# Patient Record
Sex: FEMALE | Race: BLACK OR AFRICAN AMERICAN | Employment: FULL TIME | URBAN - METROPOLITAN AREA
[De-identification: names, ages, dates, MRNs, and addresses within clinical notes are randomized per-mention and may not be internally consistent; named-entity substitution may affect disease eponyms.]

---

## 2024-02-19 ENCOUNTER — OFFICE VISIT (OUTPATIENT)
Dept: OBGYN CLINIC | Facility: CLINIC | Age: 24
End: 2024-02-19
Payer: COMMERCIAL

## 2024-02-19 VITALS — BODY MASS INDEX: 22.42 KG/M2 | HEIGHT: 67 IN | WEIGHT: 142.81 LBS

## 2024-02-19 DIAGNOSIS — Z12.4 CERVICAL CANCER SCREENING: ICD-10-CM

## 2024-02-19 DIAGNOSIS — N76.0 VAGINITIS AND VULVOVAGINITIS: Primary | ICD-10-CM

## 2024-02-19 PROCEDURE — 81514 NFCT DS BV&VAGINITIS DNA ALG: CPT | Performed by: OBSTETRICS & GYNECOLOGY

## 2024-02-19 RX ORDER — METRONIDAZOLE 500 MG/1
500 TABLET ORAL 2 TIMES DAILY
Qty: 14 TABLET | Refills: 0 | Status: SHIPPED | OUTPATIENT
Start: 2024-02-19 | End: 2024-02-26

## 2024-02-19 NOTE — H&P
[facility]  Obstetrics and Gynecology  History & Physical    CC:   Chief Complaint   Patient presents with    New Patient    Discharge     With odor  f        Subjective:     HPI: Magalis Young is a 23 year old  female here for the above chief complaint and follow up pap smear. Patient reports last year had a normal pap with BV and yeast at that time. Noted started to feel an abnormal odor about 1 mo ago. Noted discharge is cream white color appearing. Aggravating factors unknown. Not douching. Has clifford taking probiotics and this does help when using Euro probiotics. Then most recently the smell became worse. She tried boric acid suppositories which helped with the smell.       Has used OCP in the past but stopped to have more natural cycles given some headaches with taking OCP. Considering restarting OCP.     Declined STI testing given recent negative.     OB History:  OB History    Para Term  AB Living   1 0 0 0 1 0   SAB IAB Ectopic Multiple Live Births   0 0 0 0 0       Gyne History:  Hx Prior Abnormal Pap: No  Pap Date: 23  Pap Result Notes: [facility]  Patient's last menstrual period was 2024.  No history of STDs.    Last pap NILM per patient in .   Sexual history: Active? Yes, 1 male partner(s)  Birth control? None/ pull out method.     Meds:  No current outpatient medications on file prior to visit.     No current facility-administered medications on file prior to visit.       All:  No Known Allergies    PMH:  Past Medical History:   Diagnosis Date    Patient denies medical problems        Immunization History:     There is no immunization history on file for this patient.    PSH:  Past Surgical History:   Procedure Laterality Date    PATIENT DENIES ANY SURGICAL HISTORY         Social History:  Social History     Socioeconomic History    Marital status: Single     Spouse name: Not on file    Number of children: Not on file    Years of education: Not on file     Highest education level: Not on file   Occupational History    Not on file   Tobacco Use    Smoking status: Never    Smokeless tobacco: Never   Vaping Use    Vaping Use: Not on file   Substance and Sexual Activity    Alcohol use: Not Currently    Drug use: Never    Sexual activity: Yes     Partners: Male   Other Topics Concern     Service Not Asked    Blood Transfusions No    Caffeine Concern Not Asked    Occupational Exposure Not Asked    Hobby Hazards Not Asked    Sleep Concern Not Asked    Stress Concern Not Asked    Weight Concern Not Asked    Special Diet Not Asked    Back Care Not Asked    Exercise Not Asked    Bike Helmet Not Asked    Seat Belt Not Asked    Self-Exams Not Asked   Social History Narrative    No abuse     Social Determinants of Health     Financial Resource Strain: Not on file   Food Insecurity: Not on file   Transportation Needs: Not on file   Physical Activity: Not on file   Stress: Not on file   Social Connections: Not on file   Housing Stability: Not on file         Patient feels unsafe or threatened?: No  Family History:  History reviewed. No pertinent family history.    Health maintenance:  Mammogram: due at 38   Colonoscopy: due at 43.     Review of Systems:  General: no complaints  Eyes: no complaints  Respiratory: no complaints  Cardiovascular: no complaints  GI: no complaints  : See HPI  Hematological/lymphatic: no complaints  Breast: no complaints  Psychiatric: no complaints  Endocrine:no complaints  Neurological: no complaints  Immunological: no complaints  Musculoskeletal:no complaints      Objective:     Vitals:    02/19/24 1408   Weight: 142 lb 12.8 oz (64.8 kg)   Height: 67\"         Body mass index is 22.37 kg/m².    Exam with ERNESTINE Vidal present:   GENERAL: well developed, well nourished, in no apparent distress, alert and orientated X 3  PSYCH: mood and affect stable   SKIN: no rashes, no lesions  LUNGS: respiration unlabored  CARDIOVASCULAR: no  peripheral edema or varicosities, skin warm and dry  ABDOMEN: Soft, non distended; non tender, no masses  GYNE/:   External Genitalia: normal, no lesions, good perineal support  Urethra: meatus normal   Bladder: well supported  Vagina: normal mildly inflamed mucosa, no lesions, normal appearing discharge   Cervix: normal os, no lesions or bleeding  Cul-de-sac: normal  R/V: normal perineum, no hemorrhoids  EXTREMITIES:  Normal range of motion, strength 5/5 walking, nontender without edema    Assessment:     Magalis Young is a 23 year old  female here for vaginitis and cervical cancer screening.     There is no problem list on file for this patient.        Plan:   1. Vaginitis and vulvovaginitis  - Reviewed history of BV and clinical presentation consistent with BV.   - Recommend empiric Prescription. Patient agreed. Recommend to finish if feeling better. Prescription for diflucan if yeast positive.   - Vaginitis Vaginosis PCR Panel; Future  - metRONIDAZOLE 500 MG Oral Tab; Take 1 tablet (500 mg total) by mouth in the morning and 1 tablet (500 mg total) before bedtime. Do all this for 7 days.  Dispense: 14 tablet; Refill: 0    2. Cervical cancer screening  - ThinPrep PAP with HPV Reflex Request; Future     All of the findings and plan were discussed with the patient.  She notes understanding and agrees with the plan of care.  All questions were answered to the best of my ability at this time.      RTC in 1 year for a well woman exam or sooner if needed     Dr. Rubio Hopper MD    [facility]     This note was created by [employer] voice recognition. Errors in content may be related to improper recognition by the system; efforts to review and correct have been done but errors may still exist. Please be advised the primary purpose of this note is for me to communicate medical care. Standard sentence structure is not always used. Medical terminology and medical abbreviations may be used. There may be grammatical,  typographical, and automated fill ins that may have errors missed in proofreading.

## 2024-02-20 LAB
BV BACTERIA DNA VAG QL NAA+PROBE: NEGATIVE
C GLABRATA DNA VAG QL NAA+PROBE: NEGATIVE
C KRUSEI DNA VAG QL NAA+PROBE: NEGATIVE
CANDIDA DNA VAG QL NAA+PROBE: POSITIVE
T VAGINALIS DNA VAG QL NAA+PROBE: NEGATIVE

## 2024-02-22 RX ORDER — FLUCONAZOLE 150 MG/1
TABLET ORAL
Qty: 2 TABLET | Refills: 0 | Status: SHIPPED | OUTPATIENT
Start: 2024-02-22

## 2024-06-19 RX ORDER — FLUCONAZOLE 150 MG/1
TABLET ORAL
Qty: 2 TABLET | Refills: 0 | Status: SHIPPED | OUTPATIENT
Start: 2024-06-19

## 2024-07-09 ENCOUNTER — OFFICE VISIT (OUTPATIENT)
Dept: OBGYN CLINIC | Facility: CLINIC | Age: 24
End: 2024-07-09
Payer: COMMERCIAL

## 2024-07-09 VITALS
WEIGHT: 141.19 LBS | SYSTOLIC BLOOD PRESSURE: 104 MMHG | DIASTOLIC BLOOD PRESSURE: 62 MMHG | HEIGHT: 67 IN | BODY MASS INDEX: 22.16 KG/M2

## 2024-07-09 DIAGNOSIS — N94.9 GENITAL LESION, FEMALE: Primary | ICD-10-CM

## 2024-07-09 PROCEDURE — 11420 EXC H-F-NK-SP B9+MARG 0.5/<: CPT | Performed by: OBSTETRICS & GYNECOLOGY

## 2024-07-09 PROCEDURE — 88305 TISSUE EXAM BY PATHOLOGIST: CPT | Performed by: OBSTETRICS & GYNECOLOGY

## 2024-07-09 PROCEDURE — 3078F DIAST BP <80 MM HG: CPT | Performed by: OBSTETRICS & GYNECOLOGY

## 2024-07-09 PROCEDURE — 99214 OFFICE O/P EST MOD 30 MIN: CPT | Performed by: OBSTETRICS & GYNECOLOGY

## 2024-07-09 PROCEDURE — 3008F BODY MASS INDEX DOCD: CPT | Performed by: OBSTETRICS & GYNECOLOGY

## 2024-07-09 PROCEDURE — 3074F SYST BP LT 130 MM HG: CPT | Performed by: OBSTETRICS & GYNECOLOGY

## 2024-07-09 RX ORDER — IMIQUIMOD 12.5 MG/.25G
1 CREAM TOPICAL NIGHTLY
Qty: 12 EACH | Refills: 0 | Status: SHIPPED | OUTPATIENT
Start: 2024-07-09

## 2024-07-09 NOTE — PROGRESS NOTES
[facility]  Obstetrics and Gynecology   Follow Up    Subjective:     Magalis Young is a 23 year old  female who presents with c/o   Chief Complaint   Patient presents with    Acrochordon     Pts states she here for vaginal growth/skin tags       The patient reports lumps on her vagina. Noted this has occurred before. They went away about 1 year ago. They start small. No pain. No drainage. No spreading, just increasing in size. Partner does not have.     Review of Systems:  General: no complaints  Eyes: no complaints  Respiratory: no complaints  Cardiovascular: no complaints  GI: no complaints  : See HPI  Hematological/lymphatic: no complaints  Breast: no complaints  Psychiatric: no complaints  Endocrine:no complaints  Neurological: no complaints  Immunological: no complaints  Musculoskeletal:no complaints    HISTORY:  Past Medical History:    Patient denies medical problems    Skin tags, anus or rectum      Past Surgical History:   Procedure Laterality Date    Patient denies any surgical history        History reviewed. No pertinent family history.   Social History     Socioeconomic History    Marital status: Single   Tobacco Use    Smoking status: Never    Smokeless tobacco: Never   Substance and Sexual Activity    Alcohol use: Not Currently    Drug use: Never    Sexual activity: Yes     Partners: Male     Birth control/protection: Condom   Other Topics Concern    Blood Transfusions No   Social History Narrative    No abuse           Objective:     Vitals:    24 1552   BP: 104/62   Weight: 141 lb 3.2 oz (64 kg)   Height: 67\"         Body mass index is 22.12 kg/m².    Exam with MA matti Tinneal present:   GENERAL: well developed, well nourished, in no apparent distress, alert and orientated X 3  PSYCH: mood and affect stable   SKIN: no rashes, no lesions  LUNGS: respiration unlabored  CARDIOVASCULAR: no peripheral edema or varicosities, skin warm and dry  GYNE/:    External Genitalia: normal, no lesions, good perineal support  Urethra: meatus normal   Bladder: well supported  Vagina: normal mucosa, no lesions, normal discharge at the introitus.   R/V: normal perineum except for four 1 mm warty like projections from the posterior fourchette/perineum, no hemorrhoids  EXTREMITIES:  Normal range of motion, strength 5/5 walking.        Assessment:     Magalis Young is a 23 year old  female who presents for genital lesions    There is no problem list on file for this patient.        Plan:     1. Genital lesion, female  - Recommend removal today. See procedure note.   - Counseled most likely genital warts and to avoid sexual contact when having outbreak. Reviewed most outbreaks are limited and infrequent. Follow up if having more lesions. Aldera topical at home prior to. Warned that   - Imiquimod 5 % External Cream; Apply 1 Application topically nightly.  Dispense: 12 each; Refill: 0  - Specimen to Pathology, Tissue; Future  - Specimen to Pathology, Tissue     All of the findings and plan were discussed with the patient.  She notes understanding and agrees with the plan of care.  All questions were answered to the best of my ability at this time.    RTC fro annual when due or sooner if needed    Dr. Rubio Hopper MD    [facility]     This note was created by [employer] voice recognition. Errors in content may be related to improper recognition by the system; efforts to review and correct have been done but errors may still exist. Please be advised the primary purpose of this note is for me to communicate medical care. Standard sentence structure is not always used. Medical terminology and medical abbreviations may be used. There may be grammatical, typographical, and automated fill ins that may have errors missed in proofreading.

## 2024-07-09 NOTE — PROCEDURES
Vulvar lesions excision procedure note    Consent signed.  Procedure discussed with patient in detail including indication, risk, benefits, alternatives and complications.    Procedure:  The patient was prepped and draped in the dorsal lithotomy position.  Betadine splash of perineal lesions.   Injected 1%lidocaine with epinephrine.   Picked up and cut off with scissors four 1 mm warty lesions from the perineum.   All sent to pathology.   Silver nitrate to excision areas.   Patient tolerated procedure well.      Findings:  Condyloma about 1 mm in size with 4 lesions noted and removed.     Impression:  Benign genital warts.     Dr. Rubio Hopper MD    [facility]     This note was created by [employer] voice recognition. Errors in content may be related to improper recognition by the system; efforts to review and correct have been done but errors may still exist. Please be advised the primary purpose of this note is for me to communicate medical care. Standard sentence structure is not always used. Medical terminology and medical abbreviations may be used. There may be grammatical, typographical, and automated fill ins that may have errors missed in proofreading.

## 2024-09-14 ENCOUNTER — E-VISIT (OUTPATIENT)
Dept: TELEHEALTH | Age: 24
End: 2024-09-14
Payer: COMMERCIAL

## 2024-09-14 DIAGNOSIS — L30.1 DYSHIDROTIC ECZEMA: Primary | ICD-10-CM

## 2024-09-14 RX ORDER — TRIAMCINOLONE ACETONIDE 1 MG/G
CREAM TOPICAL 2 TIMES DAILY PRN
Qty: 45 G | Refills: 0 | Status: SHIPPED | OUTPATIENT
Start: 2024-09-14

## 2024-09-14 NOTE — PROGRESS NOTES
Magalis Young is a 24 year old female submitting e-visit for rash/prescription refill.  HPI:   See answers to questionnaire and [employer] message exchange  Requesting triamcinolone cream    Current Outpatient Medications   Medication Sig Dispense Refill    Imiquimod 5 % External Cream Apply 1 Application topically nightly. 12 each 0    fluconazole (DIFLUCAN) 150 MG Oral Tab Take 1 tablet by mouth now. May repeat in 72 hrs if symptoms persist. Please call office if not resolved after 2 doses. 2 tablet 0      Past Medical History:    Patient denies medical problems    Skin tags, anus or rectum      Past Surgical History:   Procedure Laterality Date    Patient denies any surgical history        No family history on file.   Social History:  Social History     Socioeconomic History    Marital status: Single   Tobacco Use    Smoking status: Never    Smokeless tobacco: Never   Substance and Sexual Activity    Alcohol use: Not Currently    Drug use: Never    Sexual activity: Yes     Partners: Male     Birth control/protection: Condom   Other Topics Concern    Blood Transfusions No   Social History Narrative    No abuse           ASSESSMENT AND PLAN:       Diagnoses and all orders for this visit:    Dyshidrotic eczema  -     triamcinolone 0.1 % External Cream; Apply topically 2 (two) times daily as needed.      Will treat with medication as listed.  Info provided on use, dose, and possible side effects  Supportive measures  Proper skin care; avoidance of triggers  Patient advised to follow up with PCP if no improvement or worsening of symptoms  Refer to [employer] message exchange for specific patient instructions      Duration of the E-visit service:  9 minutes

## 2024-12-03 DIAGNOSIS — N94.9 GENITAL LESION, FEMALE: ICD-10-CM

## 2024-12-04 RX ORDER — IMIQUIMOD 12.5 MG/.25G
1 CREAM TOPICAL NIGHTLY
Qty: 12 EACH | Refills: 0 | Status: SHIPPED | OUTPATIENT
Start: 2024-12-04

## 2024-12-08 ENCOUNTER — TELEMEDICINE (OUTPATIENT)
Dept: TELEHEALTH | Age: 24
End: 2024-12-08
Payer: COMMERCIAL

## 2024-12-08 DIAGNOSIS — R39.89 SUSPECTED UTI: Primary | ICD-10-CM

## 2024-12-08 PROBLEM — L30.1 DYSHIDROTIC ECZEMA: Status: ACTIVE | Noted: 2024-12-08

## 2024-12-08 RX ORDER — FLUCONAZOLE 150 MG/1
150 TABLET ORAL ONCE
Qty: 2 TABLET | Refills: 0 | Status: SHIPPED | OUTPATIENT
Start: 2024-12-08 | End: 2024-12-08

## 2024-12-08 RX ORDER — CEFADROXIL 500 MG/1
500 CAPSULE ORAL 2 TIMES DAILY
Qty: 10 CAPSULE | Refills: 0 | Status: SHIPPED | OUTPATIENT
Start: 2024-12-08 | End: 2024-12-13

## 2024-12-08 NOTE — PROGRESS NOTES
Virtual/Telephone Check-In    Magalis Young verbally consents to a Virtual/Telephone Check-In service on 12/08/24.  Patient has been referred to the [facility] website at [email or web address] to review the yearly Consent to Treat document.  Patient understands and accepts financial responsibility for any deductible, co-insurance and/or co-pays associated with this service.       Telehealth Verbal Consent   I conducted a telehealth visit with Magalis Young today, 12/08/24, which was completed using two-way, real-time interactive audio and video communication. This has been done in good kush to provide continuity of care in the best interest of the provider-patient relationship, due to the COVID - public health crisis/national emergency where restrictions of face-to-face office visits are ongoing. Every conscious effort was taken to allow for sufficient and adequate time to complete the visit.  The patient was made aware of the limitations of the telehealth visit, including treatment limitations as no physical exam could be performed.  The patient was advised to call 911 or to go to the ER in case there was an emergency.  The patient was also advised of the potential privacy & security concerns related to the telehealth platform.   The patient was made aware of where to find [facility]'s notice of privacy practices, telehealth consent form and other related consent forms and documents.  which are located on the [facility] website. The patient verbally agreed to telehealth consent form, related consents and the risks discussed.    Lastly, the patient confirmed that they were in [place].   Included in this visit, time may have been spent reviewing labs, medications, radiology tests and decision making. Appropriate medical decision-making and tests are ordered as detailed in the plan of care above.  Coding/billing information is submitted for this visit based on complexity of care and/or time spent for the visit.    CHIEF COMPLAINT:      Chief Complaint   Patient presents with    Urinary Symptoms       HPI:   Magalis Young is a 24 year old female who presents for a video visit.  Patient reports concerns for UTI.  Started with sx yesterday.  Last time had UTI she waited a long time and got really sick.  Up all night with urgency/frequency with scant urination.  Took a bath which did help somewhat.  Now having increased urgency/frequency again.  No burning or pain with urination. No change to smell/appearance.  No vaginal discharge/itching/pain.  LMP  .11/28-12/1.  No concerns for pregnancy.  Thinks last UTI was ~1-1.5 years ago.  No abx use recently    Current Outpatient Medications   Medication Sig Dispense Refill    Imiquimod 5 % External Cream Apply 1 Application topically nightly. 12 each 0    triamcinolone 0.1 % External Cream Apply topically 2 (two) times daily as needed. 45 g 0      Past Medical History:    Patient denies medical problems    Skin tags, anus or rectum      Past Surgical History:   Procedure Laterality Date    Patient denies any surgical history           Social History     Socioeconomic History    Marital status: Single   Tobacco Use    Smoking status: Never    Smokeless tobacco: Never   Substance and Sexual Activity    Alcohol use: Not Currently    Drug use: Never    Sexual activity: Yes     Partners: Male     Birth control/protection: Condom   Other Topics Concern    Blood Transfusions No   Social History Narrative    No abuse         REVIEW OF SYSTEMS:   GENERAL: no fevers. Feels well othrewise  SKIN: no rashes or abnormal skin lesions  GI: denies N/V/C or abdominal pain      EXAM:   General: Alert, Well-appearing, and In no acute distress  Respiratory:   Speaking in full sentences comfortably  Normal work of breathing  Head: Normocephalic  Nose: No obvious nasal discharge.  Skin: No obvious rashes or lesions from what observed.     No results found for this or any previous visit (from the past 24 hours).    ASSESSMENT AND  PLAN:   Magalis Young is a 24 year old female who presents with symptoms that are consistent with    ASSESSMENT:   Encounter Diagnosis   Name Primary?    Suspected UTI Yes       PLAN:  Discussed pros vs cons of obtaining UA/culture.  Due to time of day and hx of UTI, will empirically treat for suspected UTI as courtesy this time. Patient aware in future urine studies will likely be requested prior to starting therapy.      Patient prone to yeast infections with abx use, Diflucan prn.      Close follow up if sx not improving in 48 hours.       Meds & Refills for this Visit:  Requested Prescriptions     Signed Prescriptions Disp Refills    cefadroxil 500 MG Oral Cap 10 capsule 0     Sig: Take 1 capsule (500 mg total) by mouth 2 (two) times daily for 5 days.    fluconazole (DIFLUCAN) 150 MG Oral Tab 2 tablet 0     Sig: Take 1 tablet (150 mg total) by mouth once for 1 dose. Repeat in 72 hours if needed       Risks, benefits, and side effects of medication explained and discussed.    The patient indicates understanding of these issues and agrees to the plan.  The patient is asked to return if sx's persist or worsen.    Face to face time spent on Video Visit: 8  Total Time spent on visit including reviewing history, ordering labs/medication, patient examination and education: 14    Magalis Young understands video visit evaluation is not a substitute for face-to-face examination or emergency care. Patient advised to go to ER or call 911 for worsening symptoms or acute distress.

## 2025-02-28 ENCOUNTER — LAB REQUISITION (OUTPATIENT)
Dept: LAB | Age: 25
End: 2025-02-28

## 2025-02-28 DIAGNOSIS — Z13.818 ENCOUNTER FOR SCREENING FOR OTHER DIGESTIVE SYSTEM DISORDERS: ICD-10-CM

## 2025-02-28 DIAGNOSIS — Z13.29 ENCOUNTER FOR SCREENING FOR OTHER SUSPECTED ENDOCRINE DISORDER: ICD-10-CM

## 2025-02-28 DIAGNOSIS — L70.0 ACNE VULGARIS: ICD-10-CM

## 2025-02-28 DIAGNOSIS — N95.1 MENOPAUSAL AND FEMALE CLIMACTERIC STATES: ICD-10-CM

## 2025-02-28 DIAGNOSIS — K59.00 CONSTIPATION, UNSPECIFIED: ICD-10-CM

## 2025-02-28 DIAGNOSIS — R53.82 CHRONIC FATIGUE, UNSPECIFIED: ICD-10-CM

## 2025-02-28 DIAGNOSIS — Z13.21 ENCOUNTER FOR SCREENING FOR NUTRITIONAL DISORDER: ICD-10-CM

## 2025-02-28 DIAGNOSIS — Z13.228 ENCOUNTER FOR SCREENING FOR OTHER METABOLIC DISORDERS: ICD-10-CM

## 2025-02-28 PROCEDURE — PSEU8258 ESTRADIOL: Performed by: CLINICAL MEDICAL LABORATORY

## 2025-02-28 PROCEDURE — PSEU8223 T3, TOTAL: Performed by: CLINICAL MEDICAL LABORATORY

## 2025-02-28 PROCEDURE — 83525 ASSAY OF INSULIN: CPT | Performed by: CLINICAL MEDICAL LABORATORY

## 2025-02-28 PROCEDURE — PSEU8250 COMPREHENSIVE METABOLIC PANEL: Performed by: CLINICAL MEDICAL LABORATORY

## 2025-02-28 PROCEDURE — PSEU9835 TESTOSTERONE, BIOAVAILABLE, SEX BINDING GLOBULIN, AND TOTAL, FEMALE OR CHILD: Performed by: CLINICAL MEDICAL LABORATORY

## 2025-02-28 PROCEDURE — 84439 ASSAY OF FREE THYROXINE: CPT | Performed by: CLINICAL MEDICAL LABORATORY

## 2025-02-28 PROCEDURE — PSEU8536 IMMUNOGLOBULIN E: Performed by: CLINICAL MEDICAL LABORATORY

## 2025-02-28 PROCEDURE — PSEU8115 INSULIN LEVEL, FASTING: Performed by: CLINICAL MEDICAL LABORATORY

## 2025-02-28 PROCEDURE — 82785 ASSAY OF IGE: CPT | Performed by: CLINICAL MEDICAL LABORATORY

## 2025-02-28 PROCEDURE — PSEU8303 URIC ACID: Performed by: CLINICAL MEDICAL LABORATORY

## 2025-02-28 PROCEDURE — PSEU8171 VITAMIN B12 AND FOLATE: Performed by: CLINICAL MEDICAL LABORATORY

## 2025-02-28 PROCEDURE — 84550 ASSAY OF BLOOD/URIC ACID: CPT | Performed by: CLINICAL MEDICAL LABORATORY

## 2025-02-28 PROCEDURE — 84144 ASSAY OF PROGESTERONE: CPT | Performed by: CLINICAL MEDICAL LABORATORY

## 2025-02-28 PROCEDURE — 80061 LIPID PANEL: CPT | Performed by: CLINICAL MEDICAL LABORATORY

## 2025-02-28 PROCEDURE — PSEU10042 COPPER, BLOOD: Performed by: CLINICAL MEDICAL LABORATORY

## 2025-02-28 PROCEDURE — 86376 MICROSOMAL ANTIBODY EACH: CPT | Performed by: CLINICAL MEDICAL LABORATORY

## 2025-02-28 PROCEDURE — 80053 COMPREHEN METABOLIC PANEL: CPT | Performed by: CLINICAL MEDICAL LABORATORY

## 2025-02-28 PROCEDURE — 82670 ASSAY OF TOTAL ESTRADIOL: CPT | Performed by: CLINICAL MEDICAL LABORATORY

## 2025-02-28 PROCEDURE — 82728 ASSAY OF FERRITIN: CPT | Performed by: CLINICAL MEDICAL LABORATORY

## 2025-02-28 PROCEDURE — 86800 THYROGLOBULIN ANTIBODY: CPT | Performed by: CLINICAL MEDICAL LABORATORY

## 2025-02-28 PROCEDURE — PSEU8187 CORTISOL: Performed by: CLINICAL MEDICAL LABORATORY

## 2025-02-28 PROCEDURE — 82306 VITAMIN D 25 HYDROXY: CPT | Performed by: CLINICAL MEDICAL LABORATORY

## 2025-02-28 PROCEDURE — 81003 URINALYSIS AUTO W/O SCOPE: CPT | Performed by: CLINICAL MEDICAL LABORATORY

## 2025-02-28 PROCEDURE — PSEU8259 FERRITIN: Performed by: CLINICAL MEDICAL LABORATORY

## 2025-02-28 PROCEDURE — 83540 ASSAY OF IRON: CPT | Performed by: CLINICAL MEDICAL LABORATORY

## 2025-02-28 PROCEDURE — 84480 ASSAY TRIIODOTHYRONINE (T3): CPT | Performed by: CLINICAL MEDICAL LABORATORY

## 2025-02-28 PROCEDURE — PSEU8506 C REACTIVE PROTEIN HIGH SENSITIVITY: Performed by: CLINICAL MEDICAL LABORATORY

## 2025-02-28 PROCEDURE — 84481 FREE ASSAY (FT-3): CPT | Performed by: CLINICAL MEDICAL LABORATORY

## 2025-02-28 PROCEDURE — 82607 VITAMIN B-12: CPT | Performed by: CLINICAL MEDICAL LABORATORY

## 2025-02-28 PROCEDURE — 85025 COMPLETE CBC W/AUTO DIFF WBC: CPT | Performed by: CLINICAL MEDICAL LABORATORY

## 2025-02-28 PROCEDURE — 84402 ASSAY OF FREE TESTOSTERONE: CPT | Performed by: CLINICAL MEDICAL LABORATORY

## 2025-02-28 PROCEDURE — PSEU8556 THYROID ANTIBODIES: Performed by: CLINICAL MEDICAL LABORATORY

## 2025-02-28 PROCEDURE — PSEU8196 FREE T3: Performed by: CLINICAL MEDICAL LABORATORY

## 2025-02-28 PROCEDURE — 86141 C-REACTIVE PROTEIN HS: CPT | Performed by: CLINICAL MEDICAL LABORATORY

## 2025-02-28 PROCEDURE — 82746 ASSAY OF FOLIC ACID SERUM: CPT | Performed by: CLINICAL MEDICAL LABORATORY

## 2025-02-28 PROCEDURE — 84630 ASSAY OF ZINC: CPT | Performed by: CLINICAL MEDICAL LABORATORY

## 2025-02-28 PROCEDURE — 84443 ASSAY THYROID STIM HORMONE: CPT | Performed by: CLINICAL MEDICAL LABORATORY

## 2025-02-28 PROCEDURE — 82525 ASSAY OF COPPER: CPT | Performed by: CLINICAL MEDICAL LABORATORY

## 2025-02-28 PROCEDURE — 82533 TOTAL CORTISOL: CPT | Performed by: CLINICAL MEDICAL LABORATORY

## 2025-02-28 PROCEDURE — PSEU8270 LIPID PANEL WITHOUT REFLEX: Performed by: CLINICAL MEDICAL LABORATORY

## 2025-02-28 PROCEDURE — 83550 IRON BINDING TEST: CPT | Performed by: CLINICAL MEDICAL LABORATORY

## 2025-02-28 PROCEDURE — 84403 ASSAY OF TOTAL TESTOSTERONE: CPT | Performed by: CLINICAL MEDICAL LABORATORY

## 2025-02-28 PROCEDURE — 84270 ASSAY OF SEX HORMONE GLOBUL: CPT | Performed by: CLINICAL MEDICAL LABORATORY

## 2025-02-28 PROCEDURE — PSEU8262 FREE T4: Performed by: CLINICAL MEDICAL LABORATORY

## 2025-02-28 PROCEDURE — PSEU8904 ZINC: Performed by: CLINICAL MEDICAL LABORATORY

## 2025-02-28 PROCEDURE — PSEU8286 PROGESTERONE: Performed by: CLINICAL MEDICAL LABORATORY

## 2025-02-28 PROCEDURE — PSEU8203 IRON AND TOTAL IRON BINDING CAPACITY: Performed by: CLINICAL MEDICAL LABORATORY

## 2025-02-28 PROCEDURE — PSEU8229 VITAMIN D -25 HYDROXY: Performed by: CLINICAL MEDICAL LABORATORY

## 2025-02-28 PROCEDURE — PSEU8168 THYROID STIMULATING HORMONE REFLEX: Performed by: CLINICAL MEDICAL LABORATORY

## 2025-03-01 LAB
25(OH)D3+25(OH)D2 SERPL-MCNC: 19.6 NG/ML (ref 30–100)
ALBUMIN SERPL-MCNC: 4.5 G/DL (ref 3.4–5)
ALBUMIN/GLOB SERPL: 1.2 {RATIO} (ref 1–2.4)
ALP SERPL-CCNC: 51 UNITS/L (ref 45–117)
ALT SERPL-CCNC: 11 UNITS/L
ANION GAP SERPL CALC-SCNC: 8 MMOL/L (ref 7–19)
APPEARANCE UR: ABNORMAL
AST SERPL-CCNC: 12 UNITS/L
BASOPHILS # BLD: 0.1 K/MCL (ref 0–0.3)
BASOPHILS NFR BLD: 1 %
BILIRUB SERPL-MCNC: 1.2 MG/DL (ref 0.2–1)
BILIRUB UR QL STRIP: NEGATIVE
BUN SERPL-MCNC: 12 MG/DL (ref 6–20)
BUN/CREAT SERPL: 15 (ref 7–25)
CALCIUM SERPL-MCNC: 9.6 MG/DL (ref 8.4–10.2)
CHLORIDE SERPL-SCNC: 106 MMOL/L (ref 97–110)
CHOLEST SERPL-MCNC: 137 MG/DL
CHOLEST/HDLC SERPL: 1.7 {RATIO}
CO2 SERPL-SCNC: 26 MMOL/L (ref 21–32)
COLOR UR: YELLOW
CORTIS SERPL-MCNC: 6.7 MCG/DL (ref 3.4–22.5)
CREAT SERPL-MCNC: 0.81 MG/DL (ref 0.51–0.95)
CRP SERPL HS-MCNC: <0.16 MG/L
DEPRECATED RDW RBC: 39.6 FL (ref 39–50)
EGFRCR SERPLBLD CKD-EPI 2021: >90 ML/MIN/{1.73_M2}
EOSINOPHIL # BLD: 0 K/MCL (ref 0–0.5)
EOSINOPHIL NFR BLD: 1 %
ERYTHROCYTE [DISTWIDTH] IN BLOOD: 13.4 % (ref 11–15)
ERYTHROCYTE [SEDIMENTATION RATE] IN BLOOD BY WESTERGREN METHOD: 10 MM/HR (ref 0–20)
ESTRADIOL SERPL-MCNC: 111 PG/ML
FASTING DURATION TIME PATIENT: ABNORMAL H
FASTING DURATION TIME PATIENT: NORMAL H
FERRITIN SERPL-MCNC: 40 NG/ML (ref 8–252)
FOLATE SERPL-MCNC: 13.1 NG/ML
GLOBULIN SER-MCNC: 3.9 G/DL (ref 2–4)
GLUCOSE SERPL-MCNC: 89 MG/DL (ref 70–99)
GLUCOSE UR STRIP-MCNC: NEGATIVE MG/DL
HCT VFR BLD CALC: 36.4 % (ref 36–46.5)
HCYS SERPL-SCNC: 7.8 MCMOL/L
HDLC SERPL-MCNC: 79 MG/DL
HGB BLD-MCNC: 12.8 G/DL (ref 12–15.5)
HGB UR QL STRIP: NEGATIVE
IGE SERPL-ACNC: 221 IUNITS/ML
IMM GRANULOCYTES # BLD AUTO: 0 K/MCL (ref 0–0.2)
IMM GRANULOCYTES # BLD: 0 %
INSULIN P FAST SERPL-ACNC: 8 MUNITS/L (ref 3–28)
IRON SATN MFR SERPL: 84 % (ref 15–45)
IRON SERPL-MCNC: 282 MCG/DL (ref 50–170)
KETONES UR STRIP-MCNC: NEGATIVE MG/DL
LDLC SERPL CALC-MCNC: 48 MG/DL
LEUKOCYTE ESTERASE UR QL STRIP: NEGATIVE
LYMPHOCYTES # BLD: 2.9 K/MCL (ref 1–4.8)
LYMPHOCYTES NFR BLD: 36 %
MCH RBC QN AUTO: 28.8 PG (ref 26–34)
MCHC RBC AUTO-ENTMCNC: 35.2 G/DL (ref 32–36.5)
MCV RBC AUTO: 82 FL (ref 78–100)
MONOCYTES # BLD: 0.5 K/MCL (ref 0.3–0.9)
MONOCYTES NFR BLD: 6 %
NEUTROPHILS # BLD: 4.5 K/MCL (ref 1.8–7.7)
NEUTROPHILS NFR BLD: 56 %
NITRITE UR QL STRIP: NEGATIVE
NONHDLC SERPL-MCNC: 58 MG/DL
NRBC BLD MANUAL-RTO: 0 /100 WBC
PH UR STRIP: 6 [PH] (ref 5–7)
PLATELET # BLD AUTO: 379 K/MCL (ref 140–450)
POTASSIUM SERPL-SCNC: 3.9 MMOL/L (ref 3.4–5.1)
PROGEST SERPL-MCNC: 9.97 NG/ML
PROT SERPL-MCNC: 8.4 G/DL (ref 6.4–8.2)
PROT UR STRIP-MCNC: ABNORMAL MG/DL
RBC # BLD: 4.44 MIL/MCL (ref 4–5.2)
SODIUM SERPL-SCNC: 136 MMOL/L (ref 135–145)
SP GR UR STRIP: 1.03 (ref 1–1.03)
T3 SERPL-MCNC: 1.1 NG/ML (ref 0.6–1.81)
T3FREE SERPL-MCNC: 2.7 PG/ML (ref 2.2–4)
T4 FREE SERPL-MCNC: 1.2 NG/DL (ref 0.8–1.5)
T4 SERPL-MCNC: 9.9 MCG/DL (ref 4.7–13.3)
THYROGLOB AB SERPL-ACNC: <0.9 IU/ML (ref 0–4)
THYROPEROXIDASE AB SERPL-ACNC: <28 UNITS/ML
TIBC SERPL-MCNC: 337 MCG/DL (ref 250–450)
TRIGL SERPL-MCNC: 51 MG/DL
TSH SERPL-ACNC: 1.21 MCUNITS/ML (ref 0.35–5)
URATE SERPL-MCNC: 4 MG/DL (ref 2.6–5.9)
UROBILINOGEN UR STRIP-MCNC: 0.2 MG/DL
VIT B12 SERPL-MCNC: 383 PG/ML (ref 211–911)
WBC # BLD: 8 K/MCL (ref 4.2–11)

## 2025-03-03 LAB
COPPER SERPL-MCNC: 109 MCG/DL (ref 80–155)
SELENIUM SERPL-MCNC: 137.7 UG/L (ref 23–190)
ZINC SERPL-MCNC: 108 MCG/DL (ref 70–120)

## 2025-03-05 LAB
PYRIDOXAL PHOS SERPL-SCNC: 52.6 NMOL/L (ref 20–125)
SHBG SERPL-SCNC: 57 NMOL/L (ref 25–122)
TESTOST FREE SERPL DL<=1.0 NG/DL-MCNC: 2.3 PG/ML (ref 0.8–7.4)
TESTOST SERPL-MCNC: 20 NG/DL (ref 9–55)
TESTOSTERONE.FREE+WB SERPL-MCNC: 6.9 NG/DL (ref 2.2–20.6)

## 2025-03-07 LAB
C ALBICANS IGA SER-ACNC: 0.29 EV
C ALBICANS IGG QN: 1.07 EV
C ALBICANS IGM SER-ACNC: 0.75 EV

## 2025-03-09 LAB — HISTAMINE SERPL-SCNC: <8 NMOL/L (ref 0–8)

## 2025-03-17 LAB — SERVICE CMNT-IMP: NORMAL

## 2025-05-13 ENCOUNTER — PATIENT MESSAGE (OUTPATIENT)
Dept: OBGYN CLINIC | Facility: CLINIC | Age: 25
End: 2025-05-13

## 2025-05-14 NOTE — TELEPHONE ENCOUNTER
Rubio Hopper MD to [facility] Clinical Staff (Selected Message)        5/16/25 11:40 PM  Please contact patient to schedule appointment first routine available.      Dr. Hopper     Last visit 07/09/2024    Plan:      1. Genital lesion, female  - Recommend removal today. See procedure note.   - Counseled most likely genital warts and to avoid sexual contact when having outbreak. Reviewed most outbreaks are limited and infrequent. Follow up if having more lesions. Aldera topical at home prior to. Warned that   - Imiquimod 5 % External Cream; Apply 1 Application topically nightly.  Dispense: 12 each; Refill: 0  - Specimen to Pathology, Tissue; Future  - Specimen to Pathology, Tissue      All of the findings and plan were discussed with the patient.  She notes understanding and agrees with the plan of care.  All questions were answered to the best of my ability at this time.     RTC fro annual when due or sooner if needed     Dr. Rubio Hopper MD

## 2025-07-26 DIAGNOSIS — N94.9 GENITAL LESION, FEMALE: ICD-10-CM

## 2025-07-29 RX ORDER — IMIQUIMOD 12.5 MG/.25G
1 CREAM TOPICAL NIGHTLY
Qty: 12 EACH | Refills: 0 | Status: SHIPPED | OUTPATIENT
Start: 2025-07-29

## (undated) NOTE — LETTER
Magalis Young, :2000    CONSENT FOR PROCEDURE/SEDATION    1. I authorize the performance upon Magalis Young  the following: Vaginal wart removal    2. I authorize Dr. Rubio Hopper MD (and whomever is designated as the doctor’s assistant), to perform the above-mentioned procedures.    3. If any unforeseen conditions arise during this procedure calling for additional  procedures, operations, or medications (including anesthesia and blood transfusion), I further request and authorize the doctor to do whatever he/she deems advisable in my interest.    4. I consent to the taking and reproduction of any photographs in the course of this procedure for professional purposes.    5. I consent to the administration of such sedation as may be considered necessary or advisable by the physician responsible for this service, with the exception of ______________________________________________________    6. I have been informed by my doctor of the nature and purpose of this procedure sedation, possible alternative methods of treatment, risk involved and possible complications.      Signature of Patient:_______________________________________________    Signature of person authorized to consent for patient:  _______________________________________________________________    Relationship to patient: ____________________________________________    Witness: _________________________________________ Date:___________     Physician Signature: _______________________________ Date:___________